# Patient Record
Sex: MALE | Race: WHITE | Employment: OTHER | URBAN - NONMETROPOLITAN AREA
[De-identification: names, ages, dates, MRNs, and addresses within clinical notes are randomized per-mention and may not be internally consistent; named-entity substitution may affect disease eponyms.]

---

## 2019-09-05 ENCOUNTER — OFFICE VISIT (OUTPATIENT)
Dept: FAMILY MEDICINE | Facility: OTHER | Age: 74
End: 2019-09-05
Attending: FAMILY MEDICINE
Payer: MEDICARE

## 2019-09-05 VITALS
DIASTOLIC BLOOD PRESSURE: 80 MMHG | TEMPERATURE: 97.2 F | OXYGEN SATURATION: 97 % | RESPIRATION RATE: 20 BRPM | BODY MASS INDEX: 34.75 KG/M2 | SYSTOLIC BLOOD PRESSURE: 116 MMHG | HEART RATE: 77 BPM | WEIGHT: 256.6 LBS | HEIGHT: 72 IN

## 2019-09-05 DIAGNOSIS — S69.91XA FISH HOOK INJURY OF FINGER OF RIGHT HAND, INITIAL ENCOUNTER: Primary | ICD-10-CM

## 2019-09-05 PROCEDURE — G0463 HOSPITAL OUTPT CLINIC VISIT: HCPCS

## 2019-09-05 PROCEDURE — 99202 OFFICE O/P NEW SF 15 MIN: CPT | Performed by: NURSE PRACTITIONER

## 2019-09-05 RX ORDER — CALCIUM CARBONATE/VITAMIN D2 500 MG-125
1 TABLET ORAL DAILY
COMMUNITY
Start: 2011-11-11

## 2019-09-05 RX ORDER — FLUTICASONE PROPIONATE 50 MCG
1 SPRAY, SUSPENSION (ML) NASAL DAILY
COMMUNITY
Start: 2013-10-10

## 2019-09-05 RX ORDER — ATORVASTATIN CALCIUM 40 MG/1
40 TABLET, FILM COATED ORAL DAILY
COMMUNITY
Start: 2019-06-20

## 2019-09-05 RX ORDER — CARBAMAZEPINE 200 MG/1
400 TABLET ORAL DAILY
COMMUNITY
Start: 2019-07-12 | End: 2019-10-10

## 2019-09-05 RX ORDER — ACETAMINOPHEN 160 MG
1 TABLET,DISINTEGRATING ORAL DAILY
COMMUNITY
Start: 2012-12-10

## 2019-09-05 RX ORDER — TADALAFIL 20 MG/1
20 TABLET ORAL PRN
COMMUNITY
Start: 2016-06-09

## 2019-09-05 ASSESSMENT — PAIN SCALES - GENERAL: PAINLEVEL: NO PAIN (0)

## 2019-09-05 ASSESSMENT — MIFFLIN-ST. JEOR: SCORE: 1936.93

## 2019-09-05 NOTE — PATIENT INSTRUCTIONS
Patient Education     Fish Hook Removed  A fish hook has been removed from under your skin. This area may be sore for the next 1 to 2 days. Because this was a dirty puncture-type wound, the risk of infection is higher than normal. Antibiotics may or may not be prescribed depending on various factors such as depth, severity, and location. You may be given a tetanus shot if needed.  Home care  Your healthcare provider will give you instructions on how to care for your wound. These will depend on where the wound is and how serious it is. The following may help you care for your wound at home:    Keep the injured part elevated during the first 2 days. This will help reduce swelling and pain.    Keep the wound clean and dry. If a bandage was applied and it becomes wet or dirty, replace it. Otherwise, leave it in place for the first 24 hours.    Shower as usual, unless your healthcare provider tells you otherwise.    Don't soak in a tub or go swimming for at least 1 week or as instructed by your doctor.    Don't soak the injured area unless your doctor tells you to.    Use acetaminophen or ibuprofen to control pain, unless another pain medicine was prescribed. If you have chronic liver or kidney disease or ever had a stomach ulcer or GI bleeding, talk with your doctor before using these medicines.  Follow-up care  Most puncture wounds heal within 10 days. However, an infection may sometimes occur despite proper treatment. Check the wound daily for the warning signs listed below.   When to seek medical advice  Call your healthcare provider if any of these occur:    Increasing pain in the wound    Redness, swelling or pus coming from the wound    Fever of 100.4 F (38 C) or higher, or as directed by your healthcare provider  Date Last Reviewed: 10/1/2016    7411-8539 [employer]. [address] [place] [address], [place] [address]. All rights reserved. This information is not intended as a substitute for professional  medical care. Always follow your healthcare professional's instructions.

## 2019-09-05 NOTE — PROGRESS NOTES
SUBJECTIVE:   Ghulam Jose is a 74 year old male who presents to clinic today for the following health issues:    HPI  Got a fish hook in his right index finger this afternoon.  Attempted to remove it himself but the riky is caught and he cannot get it out.  He can fully move the finger without difficulty or pain.  Bleeding is controlled. Refuses TD as he had it 6 years ago and was billed from our facility for $90 as his insurance would cover it.  Reviewed the risks and benefits of the tetanus with patient, he again refused.    There are no active problems to display for this patient.    No past medical history on file.   No past surgical history on file.  No family history on file.  Social History     Tobacco Use     Smoking status: Former Smoker     Smokeless tobacco: Never Used   Substance Use Topics     Alcohol use: Yes     Comment: occ     Social History     Social History Narrative     Not on file     Current Outpatient Medications   Medication Sig Dispense Refill     amoxicillin-clavulanate (AUGMENTIN) 875-125 MG tablet Take 1 tablet by mouth 2 times daily for 5 days 10 tablet 0     atorvastatin (LIPITOR) 40 MG tablet Take 40 mg by mouth daily       carBAMazepine (EPITOL) 200 MG tablet Take 400 mg by mouth daily       Cholecalciferol (VITAMIN D3) 2000 units CAPS Take 1 capsule by mouth daily       fluticasone (FLONASE) 50 MCG/ACT nasal spray Spray 1 spray in nostril daily       Glucosamine-Chondroit-Vit C-Mn ([employer] GLUCOSAMINE-CHONDROITIN) TABS Take 1 tablet by mouth daily       omeprazole (PRILOSEC) 20 MG DR capsule Take 20 mg by mouth daily       tadalafil (CIALIS) 20 MG tablet Take 20 mg by mouth as needed       Allergies   Allergen Reactions     Codeine Visual Disturbance and Other (See Comments)     hallucinations         Review of Systems   Constitutional: Negative.    Musculoskeletal: Negative for arthralgias and joint swelling.   Skin: Positive for wound. Negative for color change.   Neurological:  Negative.         OBJECTIVE:     /80   Pulse 77   Temp 97.2  F (36.2  C) (Tympanic)   Resp 20   Ht 1.829 m (6')   Wt 116.4 kg (256 lb 9.6 oz)   SpO2 97%   BMI 34.80 kg/m    Body mass index is 34.8 kg/m .  Physical Exam   Constitutional: He is oriented to person, place, and time. He appears well-developed and well-nourished. No distress.   Cardiovascular: Normal rate.   Pulmonary/Chest: Effort normal.   Musculoskeletal: Normal range of motion. He exhibits no deformity.   AFROM in right hand and fingers.    Neurological: He is alert and oriented to person, place, and time.   Skin: Skin is warm. He is not diaphoretic.   [place] embedded in right index finger.   Nursing note and vitals reviewed.      Diagnostic Test Results:  No results found for this or any previous visit (from the past 24 hour(s)).    Fish hook removal  Indication: Reduce risk of infection.   Location: Right index finger  Verbal consent was obtained before procedure started.    PROCEDURE:  The appropriate timeout was taken. The area was prepped and draped in the usual sterile fashion.   Local anesthesia was achieved using 1.5 MLs of  Lidocaine 1% with epinephrine. The wound was copiously irrigated.   Using a clamp Julianne hook was grasped and pushed through the skin, removed fully without difficulty.    Estimated blood loss was less than 0.5 mL.  Patient tolerated without difficulty.   A dressing was applied to the area and anticipatory guidance, as well as standard post-procedure care, was explained.   CMS intact following procedure and dressing application.     ASSESSMENT/PLAN:       ICD-10-CM    1. Fish hook injury of finger of right hand, initial encounter S69.91XA amoxicillin-clavulanate (AUGMENTIN) 875-125 MG tablet     PLAN:  Due to the location of the injury, will treat him with 5 days of Augmentin twice daily.    Discussed wound care at home.  Monitory symptoms and f/u if concerns develop.   Given written educational materials.    I explained my diagnostic considerations and recommendations to pt who voiced understanding and agreement with the treatment plan. All questions were answered. We discussed potential side effects of any prescribed or recommended therapies, as well as expectations for response to treatments.    Disclaimer:  This note consists of words and symbols derived from keyboarding, dictation, or using voice recognition software. As a result, there may be errors in the script that have gone undetected. Please consider this when interpreting information found in this note.      AMEE Carr, NP-C  9/5/2019 at 4:29 PM  [facility]

## 2019-09-05 NOTE — NURSING NOTE
Patient presents to clinic for fish hook in index finger of right hand. Happened about 60-90 minutes ago. Last tetanus in 2013.  Chief Complaint   Patient presents with     fish hook       Initial /80   Pulse 77   Temp 97.2  F (36.2  C) (Tympanic)   Resp 20   Ht 1.829 m (6')   Wt 116.4 kg (256 lb 9.6 oz)   SpO2 97%   BMI 34.80 kg/m   Estimated body mass index is 34.8 kg/m  as calculated from the following:    Height as of this encounter: 1.829 m (6').    Weight as of this encounter: 116.4 kg (256 lb 9.6 oz).  Medication Reconciliation: complete    Azul Caceres LPN

## 2019-09-06 ASSESSMENT — ENCOUNTER SYMPTOMS
CONSTITUTIONAL NEGATIVE: 1
ARTHRALGIAS: 0
WOUND: 1
COLOR CHANGE: 0
NEUROLOGICAL NEGATIVE: 1
JOINT SWELLING: 0

## (undated) RX ORDER — LIDOCAINE HYDROCHLORIDE AND EPINEPHRINE 10; 10 MG/ML; UG/ML
INJECTION, SOLUTION INFILTRATION; PERINEURAL
Status: DISPENSED
Start: 2019-09-05